# Patient Record
Sex: FEMALE | Race: WHITE | NOT HISPANIC OR LATINO | Employment: OTHER | URBAN - METROPOLITAN AREA
[De-identification: names, ages, dates, MRNs, and addresses within clinical notes are randomized per-mention and may not be internally consistent; named-entity substitution may affect disease eponyms.]

---

## 2017-02-28 ENCOUNTER — OFFICE VISIT (OUTPATIENT)
Dept: ENDOCRINOLOGY | Age: 72
End: 2017-02-28

## 2017-02-28 VITALS
WEIGHT: 185.6 LBS | BODY MASS INDEX: 31.69 KG/M2 | HEIGHT: 64 IN | OXYGEN SATURATION: 90 % | DIASTOLIC BLOOD PRESSURE: 78 MMHG | HEART RATE: 66 BPM | SYSTOLIC BLOOD PRESSURE: 140 MMHG

## 2017-02-28 DIAGNOSIS — E55.9 VITAMIN D DEFICIENCY: ICD-10-CM

## 2017-02-28 DIAGNOSIS — M85.80 OSTEOPENIA: ICD-10-CM

## 2017-02-28 DIAGNOSIS — E55.9 VITAMIN D DEFICIENCY: Primary | ICD-10-CM

## 2017-02-28 DIAGNOSIS — E78.5 HYPERLIPIDEMIA, UNSPECIFIED HYPERLIPIDEMIA TYPE: ICD-10-CM

## 2017-02-28 DIAGNOSIS — R73.01 IMPAIRED FASTING GLUCOSE: ICD-10-CM

## 2017-02-28 DIAGNOSIS — E89.0 POSTSURGICAL HYPOTHYROIDISM: Primary | ICD-10-CM

## 2017-02-28 PROCEDURE — 99214 OFFICE O/P EST MOD 30 MIN: CPT | Performed by: INTERNAL MEDICINE

## 2017-02-28 RX ORDER — ATORVASTATIN CALCIUM 10 MG/1
TABLET, FILM COATED ORAL DAILY
COMMUNITY
Start: 2015-02-05 | End: 2017-02-28

## 2017-02-28 RX ORDER — ASCORBIC ACID 250 MG
240 TABLET,CHEWABLE ORAL DAILY
COMMUNITY

## 2017-02-28 RX ORDER — PRAVASTATIN SODIUM 10 MG
10 TABLET ORAL EVERY EVENING
Qty: 30 TABLET | Refills: 11 | Status: SHIPPED | OUTPATIENT
Start: 2017-02-28 | End: 2017-02-28 | Stop reason: SDUPTHER

## 2017-02-28 RX ORDER — OMEPRAZOLE 20 MG/1
20 CAPSULE, DELAYED RELEASE ORAL
COMMUNITY
Start: 2016-04-09 | End: 2017-02-28

## 2017-02-28 RX ORDER — NICOTINE 14MG/24HR
1 PATCH, TRANSDERMAL 24 HOURS TRANSDERMAL DAILY
COMMUNITY

## 2017-02-28 RX ORDER — PRAVASTATIN SODIUM 10 MG
10 TABLET ORAL EVERY EVENING
Qty: 90 TABLET | Refills: 2 | Status: SHIPPED | OUTPATIENT
Start: 2017-02-28 | End: 2018-02-28

## 2017-02-28 RX ORDER — LEVOTHYROXINE SODIUM 0.1 MG/1
100 TABLET ORAL DAILY
Qty: 90 TABLET | Refills: 2 | Status: SHIPPED | OUTPATIENT
Start: 2017-02-28 | End: 2017-11-15 | Stop reason: SDUPTHER

## 2017-02-28 NOTE — PROGRESS NOTES
"Subjective   Darby Carey is a 71 y.o. female.     HPI Comments: F/u for hypothyroidism     Hypothyroidism   Numbness: tingling in the right leg        Patient is 71 year-old female came in for followup. She has hypothyroidism after she had a thyroidectomy. She has been on Synthroid 100 mcg once a day. She has gained 4 lbs since 5/16. She denies any heat or cold intolerance. She denies any bowel changes.     She has a history of osteopenia and follows with her gynecologist Danielle Chawla. She had a normal mammogram last 6/15. She had bone density in 2016 and there is no change. She is taking  chewable calcium BID but does not know how much Vit D it has.     She has has impaired fasting glucose. Hemoglobin A1c done in February 2015 was 5.4%. She does not exercise regularly. Her brother had diabetes mellitus. Last meal last night.       She has hyperlipidemia.  She did well with pravastatin 10 mg once a day and Co Q 10 area but developed achiness when she went up to 20 mg per day.  She went off Lipitor 10 mg because of muscle weakness and pain.    The following portions of the patient's history were reviewed and updated as appropriate: allergies, current medications, past family history, past medical history, past social history, past surgical history and problem list.    Review of Systems   Constitutional: Negative.    HENT: Negative.    Eyes: Negative.    Respiratory: Negative.    Cardiovascular: Negative.    Gastrointestinal: Negative.    Endocrine: Negative.    Genitourinary: Positive for urgency.   Musculoskeletal: Negative.    Allergic/Immunologic: Negative.    Neurological: Numbness: tingling in the right leg     Hematological: Negative.    Psychiatric/Behavioral: Negative.        Objective      Vitals:    02/28/17 1138   BP: 140/78   BP Location: Right arm   Patient Position: Sitting   Cuff Size: Large Adult   Pulse: 66   SpO2: 90%   Weight: 185 lb 9.6 oz (84.2 kg)   Height: 63.5\" (161.3 cm)     Physical " Exam   Constitutional: She is oriented to person, place, and time. She appears well-developed and well-nourished. No distress.   HENT:   Head: Normocephalic.   Nose: Nose normal.   Mouth/Throat: No oropharyngeal exudate.   Eyes: Conjunctivae and EOM are normal. Right eye exhibits no discharge. Left eye exhibits no discharge. No scleral icterus.   Neck: Neck supple. No JVD present. No tracheal deviation present. No thyromegaly present.   Cardiovascular: Normal rate, regular rhythm, normal heart sounds and intact distal pulses.  Exam reveals no gallop and no friction rub.    No murmur heard.  Pulmonary/Chest: Effort normal and breath sounds normal. No respiratory distress. She has no wheezes. She has no rales. She exhibits no tenderness.   Abdominal: Soft. Bowel sounds are normal. She exhibits no distension and no mass. There is no tenderness.   Musculoskeletal: Normal range of motion. She exhibits no edema, tenderness or deformity.   Lymphadenopathy:     She has no cervical adenopathy.   Neurological: She is alert and oriented to person, place, and time. She displays normal reflexes. Coordination normal.   Skin: Skin is warm and dry. No erythema. No pallor.   Psychiatric: She has a normal mood and affect. Her behavior is normal.     Results Encounter on 07/17/2016   Component Date Value Ref Range Status   • Glucose 08/10/2016 91  65 - 99 mg/dL Final   • BUN 08/10/2016 10  8 - 23 mg/dL Final   • Creatinine 08/10/2016 0.63  0.57 - 1.00 mg/dL Final   • eGFR Non  Am 08/10/2016 94  >60 mL/min/1.73 Final   • eGFR African Am 08/10/2016 114  >60 mL/min/1.73 Final   • BUN/Creatinine Ratio 08/10/2016 15.9  7.0 - 25.0 Final   • Sodium 08/10/2016 145  136 - 145 mmol/L Final   • Potassium 08/10/2016 5.3* 3.5 - 5.2 mmol/L Final   • Chloride 08/10/2016 103  98 - 107 mmol/L Final   • Total CO2 08/10/2016 27.8  22.0 - 29.0 mmol/L Final   • Calcium 08/10/2016 9.5  8.6 - 10.5 mg/dL Final   • Total Protein 08/10/2016 6.9  6.0 -  8.5 g/dL Final   • Albumin 08/10/2016 4.20  3.50 - 5.20 g/dL Final   • Globulin 08/10/2016 2.7  gm/dL Final   • A/G Ratio 08/10/2016 1.6  g/dL Final   • Total Bilirubin 08/10/2016 0.5  0.1 - 1.2 mg/dL Final   • Alkaline Phosphatase 08/10/2016 100  39 - 117 U/L Final   • AST (SGOT) 08/10/2016 18  1 - 32 U/L Final   • ALT (SGPT) 08/10/2016 16  1 - 33 U/L Final   • Total Cholesterol 08/10/2016 194  0 - 200 mg/dL Final   • Triglycerides 08/10/2016 136  0 - 150 mg/dL Final   • HDL Cholesterol 08/10/2016 39* 40 - 60 mg/dL Final   • VLDL Cholesterol 08/10/2016 27.2  5 - 40 mg/dL Final   • LDL Cholesterol  08/10/2016 128* 0 - 100 mg/dL Final   • TSH 08/10/2016 0.969  0.270 - 4.200 mIU/mL Final   • 25 Hydroxy, Vitamin D 08/10/2016 30.2  30.0 - 100.0 ng/mL Final    Comment: Reference Range for Total Vitamin D 25(OH)  Deficiency    <20.0 ng/mL  Insufficiency 21-29 ng/mL  Sufficiency    ng/mL  Toxicity      >100 ng/ml        • Please note 08/10/2016 Comment   Final    Comment: We have received your request for additional testing or test  verification.  You will be notified if we are unable to process  your request.     • Written Authorization 08/10/2016 Comment   Final    Comment: Written Authorization Received.  Authorization received from Written Request 08-  Logged by Chantel Braga       Assessment/Keenan Pastor was seen today for hypothyroidism.    Diagnoses and all orders for this visit:    Postsurgical hypothyroidism  -     Comprehensive Metabolic Panel  -     TSH  -     T4, Free  -     levothyroxine (SYNTHROID) 100 MCG tablet; Take 1 tablet by mouth Daily.    Impaired fasting glucose  -     Hemoglobin A1c    Osteopenia  -     Vitamin D 25 Hydroxy    Vitamin D deficiency  -     Vitamin D 25 Hydroxy    Hyperlipidemia, unspecified hyperlipidemia type  -     Comprehensive Metabolic Panel  -     Lipid Panel  -     Hepatitis C Antibody  -     Discontinue: pravastatin (PRAVACHOL) 10 MG tablet; Take 1 tablet by  mouth Every Evening.  -     pravastatin (PRAVACHOL) 10 MG tablet; Take 1 tablet by mouth Every Evening.      Continue Synthroid 100 µg per day.  Check thyroid function tests.  Check hemoglobin A1c.  Patient has hyperlipidemia.  Her 10 year risk for heart disease and stroke is 8%.  Restart pravastatin 10 mg every evening.  Continue coenzyme Q 10.    Send copy of my note and labs to Dr. Shrestha    RTC 4 mos.

## 2017-03-01 LAB
25(OH)D3+25(OH)D2 SERPL-MCNC: 31.1 NG/ML (ref 30–100)
ALBUMIN SERPL-MCNC: 4.2 G/DL (ref 3.5–5.2)
ALBUMIN/GLOB SERPL: 1.4 G/DL
ALP SERPL-CCNC: 98 U/L (ref 39–117)
ALT SERPL-CCNC: 14 U/L (ref 1–33)
AST SERPL-CCNC: 17 U/L (ref 1–32)
BILIRUB SERPL-MCNC: 0.4 MG/DL (ref 0.1–1.2)
BUN SERPL-MCNC: 9 MG/DL (ref 8–23)
BUN/CREAT SERPL: 13.8 (ref 7–25)
CALCIUM SERPL-MCNC: 9.7 MG/DL (ref 8.6–10.5)
CHLORIDE SERPL-SCNC: 101 MMOL/L (ref 98–107)
CHOLEST SERPL-MCNC: 263 MG/DL (ref 0–200)
CO2 SERPL-SCNC: 29.8 MMOL/L (ref 22–29)
CREAT SERPL-MCNC: 0.65 MG/DL (ref 0.57–1)
GLOBULIN SER CALC-MCNC: 3.1 GM/DL
GLUCOSE SERPL-MCNC: 92 MG/DL (ref 65–99)
HBA1C MFR BLD: 5.3 % (ref 4.8–5.6)
HCV AB S/CO SERPL IA: <0.1 S/CO RATIO (ref 0–0.9)
HDLC SERPL-MCNC: 46 MG/DL (ref 40–60)
LDLC SERPL CALC-MCNC: 188 MG/DL (ref 0–100)
POTASSIUM SERPL-SCNC: 4.6 MMOL/L (ref 3.5–5.2)
PROT SERPL-MCNC: 7.3 G/DL (ref 6–8.5)
SODIUM SERPL-SCNC: 142 MMOL/L (ref 136–145)
T4 FREE SERPL-MCNC: 1.82 NG/DL (ref 0.93–1.7)
TRIGL SERPL-MCNC: 143 MG/DL (ref 0–150)
TSH SERPL DL<=0.005 MIU/L-ACNC: 1.27 MIU/ML (ref 0.27–4.2)
VLDLC SERPL CALC-MCNC: 28.6 MG/DL (ref 5–40)

## 2017-07-25 ENCOUNTER — OFFICE VISIT (OUTPATIENT)
Dept: ENDOCRINOLOGY | Age: 72
End: 2017-07-25

## 2017-07-25 VITALS
HEART RATE: 67 BPM | SYSTOLIC BLOOD PRESSURE: 132 MMHG | WEIGHT: 180.8 LBS | BODY MASS INDEX: 30.87 KG/M2 | OXYGEN SATURATION: 95 % | HEIGHT: 64 IN | DIASTOLIC BLOOD PRESSURE: 70 MMHG

## 2017-07-25 DIAGNOSIS — E55.9 VITAMIN D DEFICIENCY: ICD-10-CM

## 2017-07-25 DIAGNOSIS — M85.80 OSTEOPENIA: ICD-10-CM

## 2017-07-25 DIAGNOSIS — E89.0 POSTSURGICAL HYPOTHYROIDISM: Primary | ICD-10-CM

## 2017-07-25 DIAGNOSIS — R73.01 IMPAIRED FASTING GLUCOSE: ICD-10-CM

## 2017-07-25 DIAGNOSIS — E78.5 HYPERLIPIDEMIA, UNSPECIFIED HYPERLIPIDEMIA TYPE: ICD-10-CM

## 2017-07-25 PROCEDURE — 99214 OFFICE O/P EST MOD 30 MIN: CPT | Performed by: INTERNAL MEDICINE

## 2017-07-25 RX ORDER — CALCIUM CARBONATE 200(500)MG
1 TABLET,CHEWABLE ORAL
COMMUNITY
End: 2017-07-25

## 2017-07-25 RX ORDER — MELATONIN 10 MG
1 TABLET, SUBLINGUAL SUBLINGUAL
COMMUNITY
End: 2017-07-25 | Stop reason: ALTCHOICE

## 2017-07-25 NOTE — PROGRESS NOTES
Subjective   Darby Carey is a 71 y.o. female.     HPI Comments: F/u for hypothyroidism, impaired fasting glucose, hyperlipidemia     Hypothyroidism   Associated symptoms include joint swelling.   Hyperlipidemia   Exacerbating diseases include hypothyroidism.      Patient is 71 year-old female came in for followup. She has hypothyroidism after she had a thyroidectomy. She has been on Synthroid 100 mcg once a day. She has lost 5 lbs since 2/17. She denies any heat or cold intolerance. She denies any bowel changes.      She has a history of osteopenia and follows with her gynecologist Danielle Chawla. She had a normal mammogram last 6/15. She had bone density in 2016 and there is no change. She is taking  chewable calcium BID and Vit D      She has has impaired fasting glucose. Hemoglobin A1c done in February 2017 was 5.3%. She does not exercise regularly. Her brother had diabetes mellitus. Last meal last night.        She has hyperlipidemia.  She is on pravastatin 10 mg once a day and Co Q 10. She went off Lipitor 10 mg because of muscle weakness and pain.  She complains of bilateral knee stiffness without swelling or redness.    The following portions of the patient's history were reviewed and updated as appropriate: allergies, current medications, past family history, past medical history, past social history, past surgical history and problem list.    Review of Systems   Constitutional: Negative.    HENT: Negative.    Eyes: Negative.    Respiratory: Negative.    Cardiovascular: Negative.    Gastrointestinal: Negative.    Endocrine: Negative.    Genitourinary: Negative.    Musculoskeletal: Positive for joint swelling.   Skin: Negative.    Allergic/Immunologic: Negative.    Neurological: Negative.    Hematological: Negative.    Psychiatric/Behavioral: Positive for sleep disturbance.       Objective   Physical Exam   Constitutional: She is oriented to person, place, and time. She appears well-developed and  well-nourished. No distress.   HENT:   Head: Normocephalic.   Nose: Nose normal.   Mouth/Throat: No oropharyngeal exudate.   Eyes: Conjunctivae and EOM are normal. Right eye exhibits no discharge. Left eye exhibits no discharge. No scleral icterus.   Neck: Neck supple. No JVD present. No tracheal deviation present. No thyromegaly present.   Cardiovascular: Normal rate, regular rhythm, normal heart sounds and intact distal pulses.  Exam reveals no friction rub.    No murmur heard.  Pulmonary/Chest: Effort normal and breath sounds normal. No respiratory distress. She has no wheezes. She has no rales.   Abdominal: Soft. Bowel sounds are normal. She exhibits no distension and no mass. There is no tenderness.   Musculoskeletal: Normal range of motion. She exhibits no edema, tenderness or deformity.   Lymphadenopathy:     She has no cervical adenopathy.   Neurological: She is alert and oriented to person, place, and time. She has normal reflexes. She displays normal reflexes. No cranial nerve deficit. Coordination normal.   Skin: Skin is warm and dry. No rash noted. No erythema. No pallor.   Psychiatric: She has a normal mood and affect. Her behavior is normal.     Orders Only on 02/28/2017   Component Date Value Ref Range Status   • 25 Hydroxy, Vitamin D 02/28/2017 31.1  30.0 - 100.0 ng/mL Final    Comment: Vitamin D deficiency has been defined by the [place] [employer] and an Endocrine Society practice guideline as a  level of serum 25-OH vitamin D less than 20 ng/mL (1,2).  The Endocrine Society went on to further define vitamin D  insufficiency as a level between 21 and 29 ng/mL (2).  1. IOM ([place] [employer]). 2010. Dietary reference     intakes for calcium and D. [place]: The     National Academies Press.  2. Ratna MF, [place] NC, Alhaji TONY, et al.     Evaluation, treatment, and prevention of vitamin D     deficiency: an Endocrine Society clinical practice     guideline. JCEM. 2011 Jul;  96(8):6891-30.       Assessment/Plan   Darby was seen today for hypothyroidism and hyperlipidemia.    Diagnoses and all orders for this visit:    Postsurgical hypothyroidism  -     TSH  -     T4, Free    Osteopenia    Impaired fasting glucose  -     Comprehensive Metabolic Panel    Vitamin D deficiency    Hyperlipidemia, unspecified hyperlipidemia type  -     Comprehensive Metabolic Panel  -     Lipid Panel  -     CK  -     Aldolase      Continue Synthroid 100 µg per day.  Check thyroid function tests.  Continue calcium and vitamin D supplements.  Follow-up with gynecologist Dr. Chawla.  Continue low-fat diet.  Hold pravastatin for 2 weeks and see if knee joint pain improves.  Patient advised to follow-up with Dr. King with regards to the right ankle pain.  She may need ankle bracing to prevent knee strain.  Take Tylenol as needed for pain.    Send copy of my notes and labs to Dr. Shrestha    RTC 4 mos.

## 2017-07-26 LAB
ALBUMIN SERPL-MCNC: 4.3 G/DL (ref 3.5–5.2)
ALBUMIN/GLOB SERPL: 1.5 G/DL
ALDOLASE SERPL-CCNC: 2.1 U/L (ref 3.3–10.3)
ALP SERPL-CCNC: 88 U/L (ref 39–117)
ALT SERPL-CCNC: 16 U/L (ref 1–33)
AST SERPL-CCNC: 18 U/L (ref 1–32)
BILIRUB SERPL-MCNC: 0.7 MG/DL (ref 0.1–1.2)
BUN SERPL-MCNC: 11 MG/DL (ref 8–23)
BUN/CREAT SERPL: 15.3 (ref 7–25)
CALCIUM SERPL-MCNC: 10.1 MG/DL (ref 8.6–10.5)
CHLORIDE SERPL-SCNC: 102 MMOL/L (ref 98–107)
CHOLEST SERPL-MCNC: 226 MG/DL (ref 0–200)
CK SERPL-CCNC: 52 U/L (ref 20–180)
CO2 SERPL-SCNC: 25.7 MMOL/L (ref 22–29)
CREAT SERPL-MCNC: 0.72 MG/DL (ref 0.57–1)
GLOBULIN SER CALC-MCNC: 2.8 GM/DL
GLUCOSE SERPL-MCNC: 95 MG/DL (ref 65–99)
HDLC SERPL-MCNC: 44 MG/DL (ref 40–60)
LDLC SERPL CALC-MCNC: 161 MG/DL (ref 0–100)
POTASSIUM SERPL-SCNC: 4.8 MMOL/L (ref 3.5–5.2)
PROT SERPL-MCNC: 7.1 G/DL (ref 6–8.5)
SODIUM SERPL-SCNC: 141 MMOL/L (ref 136–145)
T4 FREE SERPL-MCNC: 1.79 NG/DL (ref 0.93–1.7)
TRIGL SERPL-MCNC: 103 MG/DL (ref 0–150)
TSH SERPL DL<=0.005 MIU/L-ACNC: 2.54 MIU/ML (ref 0.27–4.2)
VLDLC SERPL CALC-MCNC: 20.6 MG/DL (ref 5–40)

## 2017-08-30 ENCOUNTER — APPOINTMENT (OUTPATIENT)
Dept: WOMENS IMAGING | Facility: HOSPITAL | Age: 72
End: 2017-08-30

## 2017-08-30 PROCEDURE — G0202 SCR MAMMO BI INCL CAD: HCPCS | Performed by: RADIOLOGY

## 2017-08-30 PROCEDURE — 77063 BREAST TOMOSYNTHESIS BI: CPT | Performed by: RADIOLOGY

## 2017-10-15 ENCOUNTER — HOSPITAL ENCOUNTER (EMERGENCY)
Facility: HOSPITAL | Age: 72
Discharge: HOME | End: 2017-10-15
Attending: EMERGENCY MEDICINE | Admitting: EMERGENCY MEDICINE

## 2017-10-15 ENCOUNTER — APPOINTMENT (OUTPATIENT)
Dept: GENERAL RADIOLOGY | Facility: HOSPITAL | Age: 72
End: 2017-10-15

## 2017-10-15 VITALS
HEIGHT: 63 IN | WEIGHT: 185 LBS | RESPIRATION RATE: 16 BRPM | HEART RATE: 65 BPM | BODY MASS INDEX: 32.78 KG/M2 | OXYGEN SATURATION: 97 % | DIASTOLIC BLOOD PRESSURE: 67 MMHG | SYSTOLIC BLOOD PRESSURE: 125 MMHG | TEMPERATURE: 98.3 F

## 2017-10-15 DIAGNOSIS — M54.16 LUMBAR RADICULOPATHY, ACUTE: Primary | ICD-10-CM

## 2017-10-15 PROCEDURE — 25010000002 LORAZEPAM PER 2 MG: Performed by: PHYSICIAN ASSISTANT

## 2017-10-15 PROCEDURE — 72110 X-RAY EXAM L-2 SPINE 4/>VWS: CPT

## 2017-10-15 PROCEDURE — 96375 TX/PRO/DX INJ NEW DRUG ADDON: CPT

## 2017-10-15 PROCEDURE — 96374 THER/PROPH/DIAG INJ IV PUSH: CPT

## 2017-10-15 PROCEDURE — 25010000002 MORPHINE PER 10 MG: Performed by: EMERGENCY MEDICINE

## 2017-10-15 PROCEDURE — 99283 EMERGENCY DEPT VISIT LOW MDM: CPT

## 2017-10-15 RX ORDER — SODIUM CHLORIDE 0.9 % (FLUSH) 0.9 %
10 SYRINGE (ML) INJECTION AS NEEDED
Status: DISCONTINUED | OUTPATIENT
Start: 2017-10-15 | End: 2017-10-15 | Stop reason: HOSPADM

## 2017-10-15 RX ORDER — LORAZEPAM 2 MG/ML
0.5 INJECTION INTRAMUSCULAR ONCE
Status: COMPLETED | OUTPATIENT
Start: 2017-10-15 | End: 2017-10-15

## 2017-10-15 RX ORDER — MORPHINE SULFATE 2 MG/ML
2 INJECTION, SOLUTION INTRAMUSCULAR; INTRAVENOUS ONCE
Status: COMPLETED | OUTPATIENT
Start: 2017-10-15 | End: 2017-10-15

## 2017-10-15 RX ORDER — HYDROCODONE BITARTRATE AND ACETAMINOPHEN 5; 325 MG/1; MG/1
1 TABLET ORAL EVERY 6 HOURS PRN
Qty: 12 TABLET | Refills: 0 | Status: SHIPPED | OUTPATIENT
Start: 2017-10-15 | End: 2017-12-18

## 2017-10-15 RX ORDER — DIAZEPAM 5 MG/ML
5 INJECTION, SOLUTION INTRAMUSCULAR; INTRAVENOUS ONCE
Status: DISCONTINUED | OUTPATIENT
Start: 2017-10-15 | End: 2017-10-15

## 2017-10-15 RX ADMIN — MORPHINE SULFATE 2 MG: 2 INJECTION, SOLUTION INTRAMUSCULAR; INTRAVENOUS at 12:05

## 2017-10-15 RX ADMIN — LORAZEPAM 0.5 MG: 2 INJECTION, SOLUTION INTRAMUSCULAR; INTRAVENOUS at 13:06

## 2017-10-15 NOTE — ED PROVIDER NOTES
The patient presents complaining of pain that radiates from her L buttock down her LLE X 5 days. Denies any recent injuries. Pt reports that Mobic she was given at  is not working for her pain. Pt denies abdominal pain, new weakness, numbness, incontinence.  Denies recent surgical/dental/epidural procedures.    Limited physical exam:  Patient is nontoxic appearing  Lungs/cardiovascular: CTAB  Abdomen: Soft nontender  Back/extremities: Tenderness to SI area/buttock L   No T/L vertebral tenderness. Posterior Tibial pulses intact B. Negative SLR B.  Patellar and achilles reflexes intact 2+ B.  Pt with sensation, strength intact bilat lower ext.     I supervised care provided by the midlevel provider.  We have discussed this patient's history, physical exam, and treatment plan.  I have reviewed the note and personally saw and examined the patient and agree with the plan of care.    Documentation assistance provided by waqaribalin Diaz.  Information recorded by the scribe was done at my direction and has been verified and validated by me.         Pao Wallace  10/15/17 1343       Radha Diaz MD  10/16/17 1682

## 2017-10-15 NOTE — DISCHARGE INSTRUCTIONS
PLEASE READ AND REVIEW ALL DISCHARGE INSTRUCTIONS.     Please follow up with your primary care physician for any further evaluation/treatment and further management of your blood pressure.     Recheck in emergency department for any worsening and/or concerning symptoms.     Take all prescribed medicine as written and continue chronic medication.    DO NOT DRIVE WHILE TAKING PAIN MEDICATION OR MUSCLE RELAXER.    Call neurosurgery (listed in your discharge instructions) for further evaluation of your back pain.

## 2017-10-15 NOTE — ED PROVIDER NOTES
"EMERGENCY DEPARTMENT ENCOUNTER    CHIEF COMPLAINT  Chief Complaint: Leg pain  History given by:Patient  History limited by:None  Room Number: 06/06  PMD: Karri Jolly MD      HPI:  Pt is a 71 y.o. female who presents with radiating L leg pain onset 5 days ago. The patient initially thought the pain was due to a '[place]ad Horse', however the symptoms persisted. She states travels from from her buttock area all down her RLE. She was told by urgent care that she had a \"pinched nerve\" upon examination at . Patient was given a steroid shot and Rx of Mobic.  She denies any aggressive physical activity, heaving lifting, injury, dysuria, fever, and urinary or fecal incontinence.     Duration: 5 days ago  Timing:constant  Location: LLE  Radiation:to her buttock  Quality: pain  Intensity/Severity: moderate  Progression: unchanged  Associated Symptoms: pain shooting down leg  Aggravating Factors: movement, lifting  Alleviating Factors: rest  Previous Episodes: occasional  Treatment before arrival: none    MEDICAL RECORD REVIEW  The patient has no pertinent past medical history. She was seen at  on 10/12/17 by Dr. Ortiz. She was diagnosed with lumbar radiculopathy and given a DepoMedrol shot and Rx of Mobic.     PAST MEDICAL HISTORY  Active Ambulatory Problems     Diagnosis Date Noted   • Postsurgical hypothyroidism 05/05/2016   • Osteopenia 05/05/2016   • Impaired fasting glucose 05/05/2016   • Hyperlipidemia 05/05/2016   • Vitamin D deficiency 08/15/2016     Resolved Ambulatory Problems     Diagnosis Date Noted   • No Resolved Ambulatory Problems     Past Medical History:   Diagnosis Date   • Thyroid nodule        PAST SURGICAL HISTORY  Past Surgical History:   Procedure Laterality Date   • THYROID SURGERY         FAMILY HISTORY  Family History   Problem Relation Age of Onset   • Cancer Mother    • Cancer Father    • Diabetes Brother    • Cancer Brother    • Anemia Paternal Grandmother    • Colon cancer Paternal " Grandmother    • Anemia Paternal Grandfather    • Colon cancer Paternal Grandfather    • Cancer Brother    • Aortic aneurysm Brother    • Liver disease Brother        SOCIAL HISTORY  Social History     Social History   • Marital status:      Spouse name: N/A   • Number of children: N/A   • Years of education: N/A     Occupational History   • Not on file.     Social History Main Topics   • Smoking status: Never Smoker   • Smokeless tobacco: Not on file   • Alcohol use No   • Drug use: Not on file   • Sexual activity: Not on file     Other Topics Concern   • Not on file     Social History Narrative       ALLERGIES  Review of patient's allergies indicates no known allergies.    REVIEW OF SYSTEMS  Review of Systems   Constitutional: Negative for fever.   HENT: Negative for sore throat.    Eyes: Negative.    Respiratory: Negative for cough and shortness of breath.    Cardiovascular: Negative for chest pain.   Gastrointestinal: Negative for abdominal pain, diarrhea and vomiting.   Genitourinary: Negative for dysuria.   Musculoskeletal: Negative for neck pain.        L leg pain   Skin: Negative for rash.   Allergic/Immunologic: Negative.    Neurological: Negative for weakness, numbness and headaches.   Hematological: Negative.    Psychiatric/Behavioral: Negative.    All other systems reviewed and are negative.      PHYSICAL EXAM  ED Triage Vitals   Temp Heart Rate Resp BP SpO2   10/15/17 0949 10/15/17 0949 10/15/17 0949 10/15/17 1039 10/15/17 0949   97.7 °F (36.5 °C) 91 18 176/85 98 %      Temp src Heart Rate Source Patient Position BP Location FiO2 (%)   10/15/17 0949 10/15/17 0949 -- -- --   Tympanic Monitor          Physical Exam   Constitutional: She is oriented to person, place, and time and well-developed, well-nourished, and in no distress. No distress.   HENT:   Head: Normocephalic and atraumatic.   Eyes: EOM are normal. Pupils are equal, round, and reactive to light.   Neck: Normal range of motion. Neck  supple.   Cardiovascular: Normal rate, regular rhythm, normal heart sounds and intact distal pulses.    Pulmonary/Chest: Effort normal and breath sounds normal. No respiratory distress.   Abdominal: Soft. There is no tenderness. There is no rebound and no guarding.   Musculoskeletal: Normal range of motion. She exhibits no edema.   Good flexion and extension of bilateral feet and great toe.No swelling, tenderness, or redness to L posterior calf.   Neurological: She is alert and oriented to person, place, and time. She has normal sensation and normal strength.   Negative L SLR.   Skin: Skin is warm and dry. No rash noted.   Psychiatric: Mood and affect normal.   Nursing note and vitals reviewed.        RADIOLOGY  XR Spine Lumbar 4+ View   Final Result       No acute fracture is identified. Degenerative changes. For further   evaluation, if clinically indicated, MRI could be considered       This report was finalized on 10/15/2017 1:16 PM by Dr. Mil Arreaga MD.              I ordered the above noted radiological studies and reviewed the images on the PACS system.     PROCEDURES      COURSE & MEDICAL DECISION MAKING  Pertinent Imaging studies that were ordered and reviewed are noted above.  Results were reviewed/discussed with the patient and they were also made aware of online assess.  Pt also made aware that some labs, such as cultures, will not be resulted during ER visit and follow up with PMD is necessary.       PROGRESS AND CONSULTS    Progress Notes:    1136  Ordered XR Spine for further evaluation. Ordered Valium for sedation. Ordered Morphine for pain.     1230  Reviewed pt's history and workup with Dr. Diaz.  After a bedside evaluation; Dr Diaz agrees with the plan of care.    1310  The patient's history, physical exam, and image findings were discussed with the physician, who also performed a face to face history and physical exam.  I discussed all results and noted any abnormalities with patient.   "Discussed absoute need to recheck abnormalities with their family physician.  I answered any of the patient's questions.  Discussed plan for discharge, as there is no emergent indication for admission.  Pt is agreeable and understands need for follow up and repeat testing.  Pt is aware that discharge does not mean that nothing is wrong but it indicates no emergency is present and they must continue care with their family physician.  Pt is discharged with instructions to follow up with primary care doctor to have their blood pressure rechecked.       MEDICATIONS GIVEN IN ER  Medications   sodium chloride 0.9 % flush 10 mL (not administered)   morphine injection 2 mg (2 mg Intravenous Given 10/15/17 1205)   LORazepam (ATIVAN) injection 0.5 mg (0.5 mg Intravenous Given 10/15/17 1306)       /85  Pulse 91  Temp 97.7 °F (36.5 °C) (Tympanic)   Resp 18  Ht 63\" (160 cm)  Wt 185 lb (83.9 kg)  SpO2 98%  BMI 32.77 kg/m2      DIAGNOSIS  Final diagnoses:   Lumbar radiculopathy, acute       FOLLOW UP   Karri Jolly MD  [address] [id number] [phone]          Michele Rowe MD  [address] [address]  [phone]            RX     Medication List      New Prescriptions          HYDROcodone-acetaminophen 5-325 MG per tablet   Commonly known as:  NORCO   Take 1 tablet by mouth Every 6 (Six) Hours As Needed for Moderate Pain .         Changed          Co-Enzyme Q10 100 MG capsule   Take 1 tablet by mouth daily.   What changed:  how much to take           Documentation assistance provided by dc Duenas for Yvonne Escobar PA-C.  Information recorded by the dc was done at my direction and has been verified and validated by me.  Electronically signed by Yvonne Escobar on 10/15/2017 at time 2:07 PM         Deneen Duenas  10/15/17 1409       Yvonne Escobar PA-C  10/15/17 1414    "

## 2017-11-15 DIAGNOSIS — E89.0 POSTSURGICAL HYPOTHYROIDISM: ICD-10-CM

## 2017-11-15 RX ORDER — LEVOTHYROXINE SODIUM 0.1 MG/1
100 TABLET ORAL DAILY
Qty: 90 TABLET | Refills: 1 | Status: SHIPPED | OUTPATIENT
Start: 2017-11-15 | End: 2017-12-18 | Stop reason: SDUPTHER

## 2017-12-18 ENCOUNTER — OFFICE VISIT (OUTPATIENT)
Dept: ENDOCRINOLOGY | Age: 72
End: 2017-12-18

## 2017-12-18 VITALS
SYSTOLIC BLOOD PRESSURE: 112 MMHG | OXYGEN SATURATION: 90 % | HEART RATE: 65 BPM | HEIGHT: 63 IN | BODY MASS INDEX: 32 KG/M2 | WEIGHT: 180.6 LBS | DIASTOLIC BLOOD PRESSURE: 68 MMHG

## 2017-12-18 DIAGNOSIS — R73.01 IMPAIRED FASTING GLUCOSE: Primary | ICD-10-CM

## 2017-12-18 DIAGNOSIS — Z23 NEED FOR IMMUNIZATION AGAINST INFLUENZA: ICD-10-CM

## 2017-12-18 DIAGNOSIS — Z23 NEED FOR INFLUENZA VACCINATION: ICD-10-CM

## 2017-12-18 DIAGNOSIS — Z23 NEED FOR IMMUNIZATION AGAINST INFLUENZA: Primary | ICD-10-CM

## 2017-12-18 DIAGNOSIS — M85.80 OSTEOPENIA, UNSPECIFIED LOCATION: ICD-10-CM

## 2017-12-18 DIAGNOSIS — E78.5 HYPERLIPIDEMIA, UNSPECIFIED HYPERLIPIDEMIA TYPE: ICD-10-CM

## 2017-12-18 DIAGNOSIS — E89.0 POSTSURGICAL HYPOTHYROIDISM: ICD-10-CM

## 2017-12-18 DIAGNOSIS — E55.9 VITAMIN D DEFICIENCY: ICD-10-CM

## 2017-12-18 LAB
25(OH)D3+25(OH)D2 SERPL-MCNC: 36.2 NG/ML (ref 30–100)
ALBUMIN SERPL-MCNC: 4.3 G/DL (ref 3.5–5.2)
ALBUMIN/GLOB SERPL: 1.5 G/DL
ALP SERPL-CCNC: 100 U/L (ref 39–117)
ALT SERPL-CCNC: 10 U/L (ref 1–33)
AST SERPL-CCNC: 12 U/L (ref 1–32)
BILIRUB SERPL-MCNC: 0.3 MG/DL (ref 0.1–1.2)
BUN SERPL-MCNC: 14 MG/DL (ref 8–23)
BUN/CREAT SERPL: 20.6 (ref 7–25)
CALCIUM SERPL-MCNC: 9.4 MG/DL (ref 8.6–10.5)
CHLORIDE SERPL-SCNC: 101 MMOL/L (ref 98–107)
CHOLEST SERPL-MCNC: 207 MG/DL (ref 0–200)
CO2 SERPL-SCNC: 31.9 MMOL/L (ref 22–29)
CREAT SERPL-MCNC: 0.68 MG/DL (ref 0.57–1)
GFR SERPLBLD CREATININE-BSD FMLA CKD-EPI: 103 ML/MIN/1.73
GFR SERPLBLD CREATININE-BSD FMLA CKD-EPI: 85 ML/MIN/1.73
GLOBULIN SER CALC-MCNC: 2.9 GM/DL
GLUCOSE SERPL-MCNC: 94 MG/DL (ref 65–99)
HBA1C MFR BLD: 5.4 % (ref 4.8–5.6)
HDLC SERPL-MCNC: 45 MG/DL (ref 40–60)
INTERPRETATION: NORMAL
LDLC SERPL CALC-MCNC: 130 MG/DL (ref 0–100)
POTASSIUM SERPL-SCNC: 5 MMOL/L (ref 3.5–5.2)
PROT SERPL-MCNC: 7.2 G/DL (ref 6–8.5)
SODIUM SERPL-SCNC: 143 MMOL/L (ref 136–145)
T4 FREE SERPL-MCNC: 1.68 NG/DL (ref 0.93–1.7)
TRIGL SERPL-MCNC: 158 MG/DL (ref 0–150)
TSH SERPL DL<=0.005 MIU/L-ACNC: 3.2 MIU/ML (ref 0.27–4.2)
VLDLC SERPL CALC-MCNC: 31.6 MG/DL (ref 5–40)

## 2017-12-18 PROCEDURE — 90471 IMMUNIZATION ADMIN: CPT | Performed by: INTERNAL MEDICINE

## 2017-12-18 PROCEDURE — 99214 OFFICE O/P EST MOD 30 MIN: CPT | Performed by: INTERNAL MEDICINE

## 2017-12-18 PROCEDURE — 90662 IIV NO PRSV INCREASED AG IM: CPT | Performed by: INTERNAL MEDICINE

## 2017-12-18 RX ORDER — METOPROLOL SUCCINATE 25 MG/1
25 TABLET, EXTENDED RELEASE ORAL DAILY
COMMUNITY
Start: 2017-11-16 | End: 2018-12-14

## 2017-12-18 RX ORDER — OXYCODONE HYDROCHLORIDE AND ACETAMINOPHEN 5; 325 MG/1; MG/1
TABLET ORAL
COMMUNITY
Start: 2017-10-20 | End: 2017-12-18

## 2017-12-18 RX ORDER — MELOXICAM 15 MG/1
TABLET ORAL
COMMUNITY
Start: 2017-10-12 | End: 2017-12-18

## 2017-12-18 RX ORDER — CYCLOBENZAPRINE HCL 5 MG
5 TABLET ORAL NIGHTLY
COMMUNITY
Start: 2017-12-02 | End: 2018-12-14

## 2017-12-18 RX ORDER — LEVOTHYROXINE SODIUM 0.1 MG/1
100 TABLET ORAL DAILY
Qty: 90 TABLET | Refills: 2 | Status: SHIPPED | OUTPATIENT
Start: 2017-12-18 | End: 2018-06-27 | Stop reason: SDUPTHER

## 2017-12-18 NOTE — PROGRESS NOTES
Subjective   Darby Carey is a 72 y.o. female.     HPI Comments: F/u for impaired fasting glucose tolerance, hypothyroidism, hyperlipidemia / flu vaccine given today     Hypothyroidism     Hyperlipidemia   Exacerbating diseases include hypothyroidism.    Patient is 71 year-old female came in for followup. She has hypothyroidism after she had a thyroidectomy. She has been on Synthroid 100 mcg once a day and her co-pay is higher next year.   She has lost 5 lbs since 2/17. She denies any heat or cold intolerance. She denies any bowel changes.      She has a history of osteopenia and follows with her gynecologist Danielle Chawla. She had a normal mammogram last 7/17. She had bone density in 2016 and there is no change. She is taking  chewable calcium BID and Vit D.      She has has impaired fasting glucose. Hemoglobin A1c done in February 2017 was 5.3%. She does not exercise regularly. Her brother had diabetes mellitus. Last meal last night.        She has hyperlipidemia.  She is on pravastatin 10 mg once a day and Co Q 10.  She misses taking pravastatin 2 times a week.  She went off Lipitor 10 mg because of muscle weakness and pain.      The following portions of the patient's history were reviewed and updated as appropriate: allergies, current medications, past family history, past medical history, past social history, past surgical history and problem list.    Review of Systems   Constitutional: Negative.    HENT: Negative.    Eyes: Negative.    Respiratory: Negative.    Cardiovascular: Negative.    Gastrointestinal: Negative.    Endocrine: Negative.    Genitourinary: Negative.    Musculoskeletal: Negative.    Skin: Negative.    Allergic/Immunologic: Negative.    Neurological: Negative.    Hematological: Negative.    Psychiatric/Behavioral: Negative.        Objective      Vitals:    12/18/17 1002   BP: 112/68   BP Location: Right arm   Patient Position: Sitting   Cuff Size: Large Adult   Pulse: 65   SpO2: 90%  "  Weight: 81.9 kg (180 lb 9.6 oz)   Height: 160 cm (62.99\")     Physical Exam   Constitutional: She is oriented to person, place, and time. She appears well-developed and well-nourished. No distress.   HENT:   Head: Normocephalic.   Nose: Nose normal.   Mouth/Throat: No oropharyngeal exudate.   Eyes: Conjunctivae and EOM are normal. Right eye exhibits no discharge. Left eye exhibits no discharge. No scleral icterus.   Neck: Neck supple. No JVD present. No tracheal deviation present. No thyromegaly present.   Cardiovascular: Normal rate, regular rhythm, normal heart sounds and intact distal pulses.  Exam reveals no gallop and no friction rub.    No murmur heard.  Pulmonary/Chest: Effort normal and breath sounds normal. No respiratory distress. She has no wheezes. She has no rales. She exhibits no tenderness.   Abdominal: Soft. Bowel sounds are normal. She exhibits no distension and no mass. There is no tenderness.   Musculoskeletal: Normal range of motion. She exhibits no edema, tenderness or deformity.   Lymphadenopathy:     She has no cervical adenopathy.   Neurological: She is alert and oriented to person, place, and time. She has normal reflexes. She displays normal reflexes. Coordination normal.   Skin: Skin is warm and dry. No rash noted. No erythema. No pallor.   Psychiatric: She has a normal mood and affect. Her behavior is normal.     Office Visit on 07/25/2017   Component Date Value Ref Range Status   • Glucose 07/25/2017 95  65 - 99 mg/dL Final   • BUN 07/25/2017 11  8 - 23 mg/dL Final   • Creatinine 07/25/2017 0.72  0.57 - 1.00 mg/dL Final   • eGFR Non African Am 07/25/2017 80  >60 mL/min/1.73 Final   • eGFR African Am 07/25/2017 97  >60 mL/min/1.73 Final   • BUN/Creatinine Ratio 07/25/2017 15.3  7.0 - 25.0 Final   • Sodium 07/25/2017 141  136 - 145 mmol/L Final   • Potassium 07/25/2017 4.8  3.5 - 5.2 mmol/L Final   • Chloride 07/25/2017 102  98 - 107 mmol/L Final   • Total CO2 07/25/2017 25.7  22.0 - " 29.0 mmol/L Final   • Calcium 07/25/2017 10.1  8.6 - 10.5 mg/dL Final   • Total Protein 07/25/2017 7.1  6.0 - 8.5 g/dL Final   • Albumin 07/25/2017 4.30  3.50 - 5.20 g/dL Final   • Globulin 07/25/2017 2.8  gm/dL Final   • A/G Ratio 07/25/2017 1.5  g/dL Final   • Total Bilirubin 07/25/2017 0.7  0.1 - 1.2 mg/dL Final   • Alkaline Phosphatase 07/25/2017 88  39 - 117 U/L Final   • AST (SGOT) 07/25/2017 18  1 - 32 U/L Final   • ALT (SGPT) 07/25/2017 16  1 - 33 U/L Final   • TSH 07/25/2017 2.540  0.270 - 4.200 mIU/mL Final   • Free T4 07/25/2017 1.79* 0.93 - 1.70 ng/dL Final   • Total Cholesterol 07/25/2017 226* 0 - 200 mg/dL Final   • Triglycerides 07/25/2017 103  0 - 150 mg/dL Final   • HDL Cholesterol 07/25/2017 44  40 - 60 mg/dL Final   • VLDL Cholesterol 07/25/2017 20.6  5 - 40 mg/dL Final   • LDL Cholesterol  07/25/2017 161* 0 - 100 mg/dL Final   • Creatine Kinase 07/25/2017 52  20 - 180 U/L Final   • Aldolase 07/25/2017 2.1* 3.3 - 10.3 U/L Final     Assessment/Plan   Darby was seen today for hypothyroidism and hyperlipidemia.    Diagnoses and all orders for this visit:    Impaired fasting glucose  -     Comprehensive Metabolic Panel  -     Hemoglobin A1c    Postsurgical hypothyroidism  -     levothyroxine (SYNTHROID) 100 MCG tablet; Take 1 tablet by mouth Daily.  -     TSH  -     T4, Free    Vitamin D deficiency  -     levothyroxine (SYNTHROID) 100 MCG tablet; Take 1 tablet by mouth Daily.  -     Vitamin D 25 Hydroxy    Hyperlipidemia, unspecified hyperlipidemia type  -     levothyroxine (SYNTHROID) 100 MCG tablet; Take 1 tablet by mouth Daily.  -     Lipid Panel  -     TSH  -     T4, Free    Osteopenia, unspecified location  -     levothyroxine (SYNTHROID) 100 MCG tablet; Take 1 tablet by mouth Daily.  -     Vitamin D 25 Hydroxy      Switch to generic levothyroxine 100 µg per day.  Recheck thyroid function tests 2 months after the switch.  Request given to patient.  Check hemoglobin A1c.  Continue chewable calcium  twice a day and vitamin D supplements.  Continue pravastatin 10 mg every evening.    Send copy of my notes and labs to Dr. Shrestha and Dr. Danielle Chawla    RTC 4 mos.

## 2017-12-19 DIAGNOSIS — E89.0 POSTSURGICAL HYPOTHYROIDISM: Primary | ICD-10-CM

## 2018-02-19 ENCOUNTER — RESULTS ENCOUNTER (OUTPATIENT)
Dept: ENDOCRINOLOGY | Age: 73
End: 2018-02-19

## 2018-02-19 DIAGNOSIS — E89.0 POSTSURGICAL HYPOTHYROIDISM: ICD-10-CM

## 2018-06-27 ENCOUNTER — TELEPHONE (OUTPATIENT)
Dept: ENDOCRINOLOGY | Age: 73
End: 2018-06-27

## 2018-06-27 DIAGNOSIS — E89.0 POSTSURGICAL HYPOTHYROIDISM: ICD-10-CM

## 2018-06-27 DIAGNOSIS — E78.5 HYPERLIPIDEMIA, UNSPECIFIED HYPERLIPIDEMIA TYPE: ICD-10-CM

## 2018-06-27 DIAGNOSIS — E55.9 VITAMIN D DEFICIENCY: ICD-10-CM

## 2018-06-27 DIAGNOSIS — M85.80 OSTEOPENIA, UNSPECIFIED LOCATION: ICD-10-CM

## 2018-06-27 RX ORDER — LEVOTHYROXINE SODIUM 0.1 MG/1
100 TABLET ORAL DAILY
Qty: 90 TABLET | Refills: 0 | Status: SHIPPED | OUTPATIENT
Start: 2018-06-27 | End: 2018-06-29 | Stop reason: SDUPTHER

## 2018-06-27 RX ORDER — LEVOTHYROXINE SODIUM 100 MCG
100 TABLET ORAL EVERY MORNING
Qty: 90 TABLET | Refills: 0 | Status: SHIPPED | OUTPATIENT
Start: 2018-06-27 | End: 2018-06-29 | Stop reason: SDUPTHER

## 2018-06-27 NOTE — TELEPHONE ENCOUNTER
----- Message from Rimma Reed sent at 6/27/2018 10:30 AM EDT -----  Contact: PATIENT   PATIENT   REQUEST TO REFILL ISSUES WITH MEDICATION:    MEDICATION:  SYNTHROID  100 MCG tablet      MESSAGE:  PATIENT HAS CALLED IN REGARDS TO GETTING THE ABOVE MEDICATION REFILLED. PLEASE SENT IN THE BRAND NAME ONLY.  PATIENT CAN GET A 90 DAY SUPPLY FOR 10$. PLEASE SENT THE MEDICATION TO THE FOLLOWING PHARMACY  -[facility]     ANY QUESTION , PLEASE CALL THE PATIENT DIRECTLY.    PHONE NUMBER: [phone]

## 2018-06-29 ENCOUNTER — TELEPHONE (OUTPATIENT)
Dept: ENDOCRINOLOGY | Age: 73
End: 2018-06-29

## 2018-06-29 DIAGNOSIS — M85.80 OSTEOPENIA, UNSPECIFIED LOCATION: ICD-10-CM

## 2018-06-29 DIAGNOSIS — E78.5 HYPERLIPIDEMIA, UNSPECIFIED HYPERLIPIDEMIA TYPE: ICD-10-CM

## 2018-06-29 DIAGNOSIS — E55.9 VITAMIN D DEFICIENCY: ICD-10-CM

## 2018-06-29 DIAGNOSIS — E89.0 POSTSURGICAL HYPOTHYROIDISM: ICD-10-CM

## 2018-06-29 RX ORDER — LEVOTHYROXINE SODIUM 0.1 MG/1
100 TABLET ORAL DAILY
Qty: 90 TABLET | Refills: 1 | Status: SHIPPED | OUTPATIENT
Start: 2018-06-29 | End: 2018-11-28 | Stop reason: SDUPTHER

## 2018-06-29 NOTE — TELEPHONE ENCOUNTER
----- Message from Keith Lerner sent at 6/29/2018  1:25 PM EDT -----  Contact: PATIENT   PATIENT STATED THAT THE WRONG CODE WAS SUBMITTED ON THE FOLLOWING, SYNTHROID 100 MCG tablet    PLEASE RESEND WITH CODE DAW5 TO     [facility] - [place], [place] - [address] AT [facility] - [phone]  - [phone] (Phone)  [phone] (Fax)    PATIENT IS RUNNING LOW ON MEDICATION,

## 2018-09-05 ENCOUNTER — APPOINTMENT (OUTPATIENT)
Dept: WOMENS IMAGING | Facility: HOSPITAL | Age: 73
End: 2018-09-05

## 2018-09-05 PROCEDURE — 77063 BREAST TOMOSYNTHESIS BI: CPT | Performed by: RADIOLOGY

## 2018-09-05 PROCEDURE — 77067 SCR MAMMO BI INCL CAD: CPT | Performed by: RADIOLOGY

## 2018-11-28 DIAGNOSIS — E55.9 VITAMIN D DEFICIENCY: ICD-10-CM

## 2018-11-28 DIAGNOSIS — E89.0 POSTSURGICAL HYPOTHYROIDISM: ICD-10-CM

## 2018-11-28 DIAGNOSIS — E78.5 HYPERLIPIDEMIA, UNSPECIFIED HYPERLIPIDEMIA TYPE: ICD-10-CM

## 2018-11-28 DIAGNOSIS — M85.80 OSTEOPENIA, UNSPECIFIED LOCATION: ICD-10-CM

## 2018-11-28 RX ORDER — LEVOTHYROXINE SODIUM 100 MCG
TABLET ORAL
Qty: 90 TABLET | Refills: 0 | Status: SHIPPED | OUTPATIENT
Start: 2018-11-28 | End: 2018-12-14 | Stop reason: SDUPTHER

## 2018-12-14 ENCOUNTER — OFFICE VISIT (OUTPATIENT)
Dept: ENDOCRINOLOGY | Age: 73
End: 2018-12-14

## 2018-12-14 VITALS
OXYGEN SATURATION: 96 % | SYSTOLIC BLOOD PRESSURE: 124 MMHG | BODY MASS INDEX: 32.96 KG/M2 | HEART RATE: 69 BPM | DIASTOLIC BLOOD PRESSURE: 60 MMHG | HEIGHT: 63 IN | WEIGHT: 186 LBS

## 2018-12-14 DIAGNOSIS — E55.9 VITAMIN D DEFICIENCY: ICD-10-CM

## 2018-12-14 DIAGNOSIS — R73.01 IMPAIRED FASTING GLUCOSE: Primary | ICD-10-CM

## 2018-12-14 DIAGNOSIS — E89.0 POSTSURGICAL HYPOTHYROIDISM: ICD-10-CM

## 2018-12-14 DIAGNOSIS — M85.80 OSTEOPENIA, UNSPECIFIED LOCATION: ICD-10-CM

## 2018-12-14 DIAGNOSIS — E78.5 HYPERLIPIDEMIA, UNSPECIFIED HYPERLIPIDEMIA TYPE: ICD-10-CM

## 2018-12-14 LAB
25(OH)D3+25(OH)D2 SERPL-MCNC: 32.6 NG/ML (ref 30–100)
ALBUMIN SERPL-MCNC: 4.1 G/DL (ref 3.5–5.2)
ALBUMIN/GLOB SERPL: 1.5 G/DL
ALP SERPL-CCNC: 86 U/L (ref 39–117)
ALT SERPL-CCNC: 18 U/L (ref 1–33)
AST SERPL-CCNC: 18 U/L (ref 1–32)
BILIRUB SERPL-MCNC: 0.5 MG/DL (ref 0.1–1.2)
BUN SERPL-MCNC: 12 MG/DL (ref 8–23)
BUN/CREAT SERPL: 17.4 (ref 7–25)
CALCIUM SERPL-MCNC: 9.4 MG/DL (ref 8.6–10.5)
CHLORIDE SERPL-SCNC: 104 MMOL/L (ref 98–107)
CHOLEST SERPL-MCNC: 250 MG/DL (ref 0–200)
CO2 SERPL-SCNC: 27.9 MMOL/L (ref 22–29)
CREAT SERPL-MCNC: 0.69 MG/DL (ref 0.57–1)
GLOBULIN SER CALC-MCNC: 2.8 GM/DL
GLUCOSE SERPL-MCNC: 95 MG/DL (ref 65–99)
HBA1C MFR BLD: 5.04 % (ref 4.8–5.6)
HDLC SERPL-MCNC: 42 MG/DL (ref 40–60)
INTERPRETATION: NORMAL
LDLC SERPL CALC-MCNC: 186 MG/DL (ref 0–100)
Lab: NORMAL
POTASSIUM SERPL-SCNC: 4.9 MMOL/L (ref 3.5–5.2)
PROT SERPL-MCNC: 6.9 G/DL (ref 6–8.5)
SODIUM SERPL-SCNC: 144 MMOL/L (ref 136–145)
T4 FREE SERPL-MCNC: 1.73 NG/DL (ref 0.93–1.7)
TRIGL SERPL-MCNC: 112 MG/DL (ref 0–150)
TSH SERPL DL<=0.005 MIU/L-ACNC: 2.49 MIU/ML (ref 0.27–4.2)
VLDLC SERPL CALC-MCNC: 22.4 MG/DL (ref 5–40)

## 2018-12-14 PROCEDURE — 90674 CCIIV4 VAC NO PRSV 0.5 ML IM: CPT | Performed by: INTERNAL MEDICINE

## 2018-12-14 PROCEDURE — G0008 ADMIN INFLUENZA VIRUS VAC: HCPCS | Performed by: INTERNAL MEDICINE

## 2018-12-14 PROCEDURE — 99214 OFFICE O/P EST MOD 30 MIN: CPT | Performed by: INTERNAL MEDICINE

## 2018-12-14 RX ORDER — LEVOTHYROXINE SODIUM 0.1 MG/1
100 TABLET ORAL DAILY
Qty: 90 TABLET | Refills: 1 | Status: SHIPPED | OUTPATIENT
Start: 2018-12-14 | End: 2019-06-28 | Stop reason: SDUPTHER

## 2018-12-14 NOTE — PROGRESS NOTES
Subjective   Darby Carey is a 72 y.o. female.     F/u for impaired glucose tolerance, hypothyroidism, hyperlipidemia / flu vaccine given today         Hypothyroidism   Associated symptoms include numbness (legs ).   Hyperlipidemia   Exacerbating diseases include hypothyroidism.      Patient is 72 year-old female came in for followup. She has hypothyroidism after she had a thyroidectomy. She has been on Synthroid 100 mcg once a day.  She has gained 5 lbs since 12/17. She denies any heat or cold intolerance. She denies any bowel changes.      She has a history of osteopenia and follows with her gynecologist Danielle Chawla. She had a normal mammogram last 7/17. She had bone density in 2018 and there is no change. She is taking  chewable calcium once a day and Vit D.      She has has impaired fasting glucose. Hemoglobin A1c done in February 2017 was 5.3%. She does not exercise regularly. Her brother had diabetes mellitus. Last meal last night.        She has hyperlipidemia.  She stopped pravastatin 6 months ago because she thinks it may her sore.    She went off Lipitor 10 mg because of muscle weakness and pain.      The following portions of the patient's history were reviewed and updated as appropriate: allergies, current medications, past family history, past medical history, past social history, past surgical history and problem list.    Review of Systems   Constitutional: Negative.    HENT: Negative.    Eyes: Negative.    Respiratory: Negative.    Cardiovascular: Negative.    Gastrointestinal: Negative.    Endocrine: Negative.    Genitourinary: Positive for urgency.   Musculoskeletal: Negative.    Skin: Negative.    Allergic/Immunologic: Negative.    Neurological: Positive for numbness (legs ).   Hematological: Negative.    Psychiatric/Behavioral: Negative.        Objective      Vitals:    12/14/18 1032   BP: 124/60   BP Location: Right arm   Patient Position: Sitting   Cuff Size: Large Adult   Pulse: 69  "  SpO2: 96%   Weight: 84.4 kg (186 lb)   Height: 160 cm (62.99\")     Physical Exam  Office Visit on 12/18/2017   Component Date Value Ref Range Status   • Glucose 12/18/2017 94  65 - 99 mg/dL Final   • BUN 12/18/2017 14  8 - 23 mg/dL Final   • Creatinine 12/18/2017 0.68  0.57 - 1.00 mg/dL Final   • eGFR Non  Am 12/18/2017 85  >60 mL/min/1.73 Final    Comment: The MDRD GFR formula is only valid for adults with stable  renal function between ages 18 and 70.     • eGFR  Am 12/18/2017 103  >60 mL/min/1.73 Final   • BUN/Creatinine Ratio 12/18/2017 20.6  7.0 - 25.0 Final   • Sodium 12/18/2017 143  136 - 145 mmol/L Final   • Potassium 12/18/2017 5.0  3.5 - 5.2 mmol/L Final   • Chloride 12/18/2017 101  98 - 107 mmol/L Final   • Total CO2 12/18/2017 31.9* 22.0 - 29.0 mmol/L Final   • Calcium 12/18/2017 9.4  8.6 - 10.5 mg/dL Final   • Total Protein 12/18/2017 7.2  6.0 - 8.5 g/dL Final   • Albumin 12/18/2017 4.30  3.50 - 5.20 g/dL Final   • Globulin 12/18/2017 2.9  gm/dL Final   • A/G Ratio 12/18/2017 1.5  g/dL Final   • Total Bilirubin 12/18/2017 0.3  0.1 - 1.2 mg/dL Final   • Alkaline Phosphatase 12/18/2017 100  39 - 117 U/L Final   • AST (SGOT) 12/18/2017 12  1 - 32 U/L Final   • ALT (SGPT) 12/18/2017 10  1 - 33 U/L Final   • Total Cholesterol 12/18/2017 207* 0 - 200 mg/dL Final   • Triglycerides 12/18/2017 158* 0 - 150 mg/dL Final   • HDL Cholesterol 12/18/2017 45  40 - 60 mg/dL Final   • VLDL Cholesterol 12/18/2017 31.6  5 - 40 mg/dL Final   • LDL Cholesterol  12/18/2017 130* 0 - 100 mg/dL Final   • Hemoglobin A1C 12/18/2017 5.40  4.80 - 5.60 % Final    Comment: Hemoglobin A1C Ranges:  Increased Risk for Diabetes  5.7% to 6.4%  Diabetes                     >= 6.5%  Diabetic Goal                < 7.0%     • TSH 12/18/2017 3.200  0.270 - 4.200 mIU/mL Final   • Free T4 12/18/2017 1.68  0.93 - 1.70 ng/dL Final   • 25 Hydroxy, Vitamin D 12/18/2017 36.2  30.0 - 100.0 ng/mL Final    Comment: Reference Range for " Total Vitamin D 25(OH)  Deficiency    <20.0 ng/mL  Insufficiency 21-29 ng/mL  Sufficiency    ng/mL  Toxicity      >100 ng/ml        • Interpretation 12/18/2017 Note   Final    Supplemental report is available.     Assessment/Plan   Darby was seen today for hypothyroidism and hyperlipidemia.    Diagnoses and all orders for this visit:    Impaired fasting glucose  -     Comprehensive Metabolic Panel  -     Hemoglobin A1c    Postsurgical hypothyroidism  -     Comprehensive Metabolic Panel  -     Lipid Panel  -     TSH  -     T4, Free    Vitamin D deficiency  -     Comprehensive Metabolic Panel  -     Vitamin D 25 Hydroxy    Hyperlipidemia, unspecified hyperlipidemia type  -     Comprehensive Metabolic Panel  -     Lipid Panel    Osteopenia, unspecified location  -     Comprehensive Metabolic Panel  -     Vitamin D 25 Hydroxy      Continue Synthroid 100 µg per day.  Check thyroid function tests and hemoglobin A1c.  Check vitamin D.  Check lipid profile.    Send copy of my note to Dr. Shrestha    RTC 6 mos

## 2018-12-20 DIAGNOSIS — R68.89 ABNORMAL ENDOCRINE LABORATORY TEST FINDING: ICD-10-CM

## 2018-12-20 DIAGNOSIS — E78.49 OTHER HYPERLIPIDEMIA: Primary | ICD-10-CM

## 2019-02-11 DIAGNOSIS — E55.9 VITAMIN D DEFICIENCY: ICD-10-CM

## 2019-02-11 DIAGNOSIS — M85.80 OSTEOPENIA, UNSPECIFIED LOCATION: ICD-10-CM

## 2019-02-11 DIAGNOSIS — E89.0 POSTSURGICAL HYPOTHYROIDISM: ICD-10-CM

## 2019-02-11 DIAGNOSIS — E78.5 HYPERLIPIDEMIA, UNSPECIFIED HYPERLIPIDEMIA TYPE: ICD-10-CM

## 2019-02-11 RX ORDER — LEVOTHYROXINE SODIUM 100 MCG
TABLET ORAL
Qty: 90 TABLET | Refills: 0 | Status: SHIPPED | OUTPATIENT
Start: 2019-02-11 | End: 2019-06-27 | Stop reason: SDUPTHER

## 2019-02-12 LAB
ALBUMIN SERPL-MCNC: 4 G/DL (ref 3.5–4.8)
ALBUMIN/GLOB SERPL: 1.4 {RATIO} (ref 1.2–2.2)
ALP SERPL-CCNC: 105 IU/L (ref 39–117)
ALT SERPL-CCNC: 14 IU/L (ref 0–32)
AST SERPL-CCNC: 14 IU/L (ref 0–40)
BILIRUB SERPL-MCNC: 0.5 MG/DL (ref 0–1.2)
BUN SERPL-MCNC: 9 MG/DL (ref 8–27)
BUN/CREAT SERPL: 14 (ref 12–28)
CALCIUM SERPL-MCNC: 9.4 MG/DL (ref 8.7–10.3)
CHLORIDE SERPL-SCNC: 100 MMOL/L (ref 96–106)
CHOLEST SERPL-MCNC: 186 MG/DL (ref 100–199)
CO2 SERPL-SCNC: 25 MMOL/L (ref 20–29)
CREAT SERPL-MCNC: 0.66 MG/DL (ref 0.57–1)
GLOBULIN SER CALC-MCNC: 2.8 G/DL (ref 1.5–4.5)
GLUCOSE SERPL-MCNC: 93 MG/DL (ref 65–99)
HDLC SERPL-MCNC: 43 MG/DL
INTERPRETATION: NORMAL
LDLC SERPL CALC-MCNC: 123 MG/DL (ref 0–99)
POTASSIUM SERPL-SCNC: 4.7 MMOL/L (ref 3.5–5.2)
PROT SERPL-MCNC: 6.8 G/DL (ref 6–8.5)
SODIUM SERPL-SCNC: 141 MMOL/L (ref 134–144)
T4 FREE SERPL-MCNC: 1.65 NG/DL (ref 0.82–1.77)
TRIGL SERPL-MCNC: 99 MG/DL (ref 0–149)
TSH SERPL DL<=0.005 MIU/L-ACNC: 1.82 UIU/ML (ref 0.45–4.5)
VLDLC SERPL CALC-MCNC: 20 MG/DL (ref 5–40)

## 2019-02-14 ENCOUNTER — RESULTS ENCOUNTER (OUTPATIENT)
Dept: ENDOCRINOLOGY | Age: 74
End: 2019-02-14

## 2019-02-14 DIAGNOSIS — E78.49 OTHER HYPERLIPIDEMIA: ICD-10-CM

## 2019-02-14 DIAGNOSIS — R68.89 ABNORMAL ENDOCRINE LABORATORY TEST FINDING: ICD-10-CM

## 2019-02-19 DIAGNOSIS — E78.49 OTHER HYPERLIPIDEMIA: Primary | ICD-10-CM

## 2019-02-19 DIAGNOSIS — R68.89 ABNORMAL ENDOCRINE LABORATORY TEST FINDING: ICD-10-CM

## 2019-02-19 DIAGNOSIS — E78.5 HYPERLIPIDEMIA: ICD-10-CM

## 2019-02-19 RX ORDER — PRAVASTATIN SODIUM 20 MG
TABLET ORAL
Qty: 90 TABLET | Refills: 1 | Status: SHIPPED | OUTPATIENT
Start: 2019-02-19 | End: 2019-06-22 | Stop reason: SDUPTHER

## 2019-04-08 ENCOUNTER — RESULTS ENCOUNTER (OUTPATIENT)
Dept: ENDOCRINOLOGY | Age: 74
End: 2019-04-08

## 2019-04-08 DIAGNOSIS — R68.89 ABNORMAL ENDOCRINE LABORATORY TEST FINDING: ICD-10-CM

## 2019-04-08 DIAGNOSIS — E78.49 OTHER HYPERLIPIDEMIA: ICD-10-CM

## 2019-04-09 LAB
ALBUMIN SERPL-MCNC: 4.4 G/DL (ref 3.5–5.2)
ALBUMIN/GLOB SERPL: 1.6 G/DL
ALP SERPL-CCNC: 99 U/L (ref 39–117)
ALT SERPL-CCNC: 14 U/L (ref 1–33)
AST SERPL-CCNC: 17 U/L (ref 1–32)
BILIRUB SERPL-MCNC: 0.4 MG/DL (ref 0.2–1.2)
BUN SERPL-MCNC: 7 MG/DL (ref 8–23)
BUN/CREAT SERPL: 10.4 (ref 7–25)
CALCIUM SERPL-MCNC: 10 MG/DL (ref 8.6–10.5)
CHLORIDE SERPL-SCNC: 102 MMOL/L (ref 98–107)
CHOLEST SERPL-MCNC: 196 MG/DL (ref 0–200)
CO2 SERPL-SCNC: 28.4 MMOL/L (ref 22–29)
CREAT SERPL-MCNC: 0.67 MG/DL (ref 0.57–1)
GLOBULIN SER CALC-MCNC: 2.7 GM/DL
GLUCOSE SERPL-MCNC: 95 MG/DL (ref 65–99)
HDLC SERPL-MCNC: 43 MG/DL (ref 40–60)
INTERPRETATION: NORMAL
LDLC SERPL CALC-MCNC: 132 MG/DL (ref 0–100)
POTASSIUM SERPL-SCNC: 5 MMOL/L (ref 3.5–5.2)
PROT SERPL-MCNC: 7.1 G/DL (ref 6–8.5)
SODIUM SERPL-SCNC: 143 MMOL/L (ref 136–145)
TRIGL SERPL-MCNC: 103 MG/DL (ref 0–150)
VLDLC SERPL CALC-MCNC: 20.6 MG/DL

## 2019-04-26 DIAGNOSIS — M85.80 OSTEOPENIA, UNSPECIFIED LOCATION: ICD-10-CM

## 2019-04-26 DIAGNOSIS — E78.5 HYPERLIPIDEMIA, UNSPECIFIED HYPERLIPIDEMIA TYPE: ICD-10-CM

## 2019-04-26 DIAGNOSIS — E55.9 VITAMIN D DEFICIENCY: ICD-10-CM

## 2019-04-26 DIAGNOSIS — E89.0 POSTSURGICAL HYPOTHYROIDISM: ICD-10-CM

## 2019-04-26 RX ORDER — LEVOTHYROXINE SODIUM 100 MCG
TABLET ORAL
Qty: 90 TABLET | Refills: 1 | Status: SHIPPED | OUTPATIENT
Start: 2019-04-26 | End: 2019-10-10 | Stop reason: SDUPTHER

## 2019-06-22 DIAGNOSIS — R68.89 ABNORMAL ENDOCRINE LABORATORY TEST FINDING: ICD-10-CM

## 2019-06-22 DIAGNOSIS — E78.5 HYPERLIPIDEMIA: ICD-10-CM

## 2019-06-24 RX ORDER — PRAVASTATIN SODIUM 20 MG
TABLET ORAL
Qty: 90 TABLET | Refills: 0 | Status: SHIPPED | OUTPATIENT
Start: 2019-06-24 | End: 2019-07-22 | Stop reason: SDUPTHER

## 2019-06-27 NOTE — PROGRESS NOTES
Subjective   Darby Carey is a 73 y.o. female.     F/u for impaired fasting glucose, hypothyroidism , hyperlipidemia       Patient is 73 year-old female came in for followup. She has hypothyroidism after she had a thyroidectomy. She has been on Synthroid 100 mcg once a day.  She has  no weight change since December 2018.  She denies any heat or cold intolerance. She denies any bowel changes.      She has a history of osteopenia and follows with her gynecologist Danielle Chawla. She had a normal mammogram last 7/17. She had bone density in 2018 and there is no change. She is taking  chewable calcium once a day and Vit D.      She has has impaired fasting glucose. Hemoglobin A1c done in February 2017 was 5.3%. She does not exercise regularly. Her brother had diabetes mellitus. Last meal was last night.        She has hyperlipidemia.  She is on pravastatin 30 mg q PM.  She went off Lipitor 10 mg because of muscle weakness and pain.  Her last meal was last night    She has basal cell cancer on her nose which will be excised.    She had a normal colonoscopy except for diverticulosis in February 2019    The following portions of the patient's history were reviewed and updated as appropriate: allergies, current medications, past family history, past medical history, past social history, past surgical history and problem list.    Review of Systems   Constitutional: Negative.    HENT: Negative.    Eyes: Negative.    Respiratory: Negative.    Cardiovascular: Negative.    Gastrointestinal: Negative.    Endocrine: Negative.    Genitourinary: Negative.    Musculoskeletal: Positive for back pain.   Skin: Negative.    Allergic/Immunologic: Negative.    Neurological: Negative.    Hematological: Negative.    Psychiatric/Behavioral: Negative.        Objective      Vitals:    06/28/19 1210   BP: 122/70   BP Location: Right arm   Patient Position: Sitting   Cuff Size: Large Adult   Pulse: 63   SpO2: 92%   Weight: 84.6 kg (186 lb  "6.4 oz)   Height: 160 cm (62.99\")     Physical Exam   Constitutional: She is oriented to person, place, and time. She appears well-developed and well-nourished. No distress.   HENT:   Head: Normocephalic.   Right Ear: External ear normal.   Left Ear: External ear normal.   Mouth/Throat: Oropharynx is clear and moist. No oropharyngeal exudate.   Eyes: Conjunctivae and EOM are normal. Right eye exhibits no discharge. Left eye exhibits no discharge. No scleral icterus.   Neck: Neck supple. No JVD present. No tracheal deviation present. No thyromegaly present.   Cardiovascular: Normal rate, regular rhythm, normal heart sounds and intact distal pulses. Exam reveals no gallop and no friction rub.   No murmur heard.  Pulmonary/Chest: Effort normal and breath sounds normal. No stridor. No respiratory distress. She has no wheezes. She has no rales. She exhibits no tenderness.   Abdominal: Soft. Bowel sounds are normal. She exhibits no distension and no mass. There is no tenderness. There is no rebound and no guarding.   Musculoskeletal: Normal range of motion. She exhibits no edema, tenderness or deformity.   Lymphadenopathy:     She has no cervical adenopathy.   Neurological: She is alert and oriented to person, place, and time. She displays normal reflexes. Coordination normal.   Intact light touch on lower ext   Skin: Skin is warm and dry. No erythema. No pallor.   Psychiatric: She has a normal mood and affect. Her behavior is normal.     Results Encounter on 04/08/2019   Component Date Value Ref Range Status   • Glucose 04/08/2019 95  65 - 99 mg/dL Final   • BUN 04/08/2019 7* 8 - 23 mg/dL Final   • Creatinine 04/08/2019 0.67  0.57 - 1.00 mg/dL Final   • eGFR Non  Am 04/08/2019 87  >60 mL/min/1.73 Final    Comment: The MDRD GFR formula is only valid for adults with stable  renal function between ages 18 and 70.     • eGFR  Am 04/08/2019 105  >60 mL/min/1.73 Final   • BUN/Creatinine Ratio 04/08/2019 10.4  7.0 " - 25.0 Final   • Sodium 04/08/2019 143  136 - 145 mmol/L Final   • Potassium 04/08/2019 5.0  3.5 - 5.2 mmol/L Final   • Chloride 04/08/2019 102  98 - 107 mmol/L Final   • Total CO2 04/08/2019 28.4  22.0 - 29.0 mmol/L Final   • Calcium 04/08/2019 10.0  8.6 - 10.5 mg/dL Final   • Total Protein 04/08/2019 7.1  6.0 - 8.5 g/dL Final   • Albumin 04/08/2019 4.40  3.50 - 5.20 g/dL Final   • Globulin 04/08/2019 2.7  gm/dL Final   • A/G Ratio 04/08/2019 1.6  g/dL Final   • Total Bilirubin 04/08/2019 0.4  0.2 - 1.2 mg/dL Final   • Alkaline Phosphatase 04/08/2019 99  39 - 117 U/L Final   • AST (SGOT) 04/08/2019 17  1 - 32 U/L Final   • ALT (SGPT) 04/08/2019 14  1 - 33 U/L Final   • Total Cholesterol 04/08/2019 196  0 - 200 mg/dL Final   • Triglycerides 04/08/2019 103  0 - 150 mg/dL Final   • HDL Cholesterol 04/08/2019 43  40 - 60 mg/dL Final   • VLDL Cholesterol 04/08/2019 20.6  mg/dL Final   • LDL Cholesterol  04/08/2019 132* 0 - 100 mg/dL Final   • Interpretation 04/08/2019 Note   Final    Supplemental report is available.     Assessment/Plan   Darby was seen today for hypothyroidism and hyperlipidemia.    Diagnoses and all orders for this visit:    Impaired fasting glucose  -     Comprehensive Metabolic Panel  -     Hemoglobin A1c    Postsurgical hypothyroidism  -     Comprehensive Metabolic Panel  -     TSH    Vitamin D deficiency  -     Vitamin D 25 Hydroxy    Other hyperlipidemia  -     Comprehensive Metabolic Panel  -     Lipid Panel    Osteopenia of multiple sites      Continue no concentrated sweet low-fat diet.  Continue Synthroid 100 mcg/day.  Continue calcium and vitamin D supplements.  Follow-up with Dr. Danielle Chawla for osteopenia.  Continue pravastatin 30 mg/day.    Copy of my note sent to Dr. Jolly and Dr. Danielle Chawla.    RTC 4 mos

## 2019-06-28 ENCOUNTER — OFFICE VISIT (OUTPATIENT)
Dept: ENDOCRINOLOGY | Age: 74
End: 2019-06-28

## 2019-06-28 VITALS
HEART RATE: 63 BPM | HEIGHT: 63 IN | WEIGHT: 186.4 LBS | DIASTOLIC BLOOD PRESSURE: 70 MMHG | OXYGEN SATURATION: 92 % | SYSTOLIC BLOOD PRESSURE: 122 MMHG | BODY MASS INDEX: 33.03 KG/M2

## 2019-06-28 DIAGNOSIS — R73.01 IMPAIRED FASTING GLUCOSE: Primary | ICD-10-CM

## 2019-06-28 DIAGNOSIS — E78.49 OTHER HYPERLIPIDEMIA: ICD-10-CM

## 2019-06-28 DIAGNOSIS — M85.89 OSTEOPENIA OF MULTIPLE SITES: ICD-10-CM

## 2019-06-28 DIAGNOSIS — E55.9 VITAMIN D DEFICIENCY: ICD-10-CM

## 2019-06-28 DIAGNOSIS — E89.0 POSTSURGICAL HYPOTHYROIDISM: ICD-10-CM

## 2019-06-28 PROCEDURE — 99214 OFFICE O/P EST MOD 30 MIN: CPT | Performed by: INTERNAL MEDICINE

## 2019-06-28 RX ORDER — CHLORAL HYDRATE 500 MG
1000 CAPSULE ORAL
COMMUNITY

## 2019-06-29 LAB
25(OH)D3+25(OH)D2 SERPL-MCNC: 34.4 NG/ML (ref 30–100)
ALBUMIN SERPL-MCNC: 4 G/DL (ref 3.5–5.2)
ALBUMIN/GLOB SERPL: 1.5 G/DL
ALP SERPL-CCNC: 94 U/L (ref 39–117)
ALT SERPL-CCNC: 12 U/L (ref 1–33)
AST SERPL-CCNC: 15 U/L (ref 1–32)
BILIRUB SERPL-MCNC: 0.6 MG/DL (ref 0.2–1.2)
BUN SERPL-MCNC: 11 MG/DL (ref 8–23)
BUN/CREAT SERPL: 20.8 (ref 7–25)
CALCIUM SERPL-MCNC: 9.3 MG/DL (ref 8.6–10.5)
CHLORIDE SERPL-SCNC: 103 MMOL/L (ref 98–107)
CHOLEST SERPL-MCNC: 170 MG/DL (ref 0–200)
CO2 SERPL-SCNC: 28.7 MMOL/L (ref 22–29)
CREAT SERPL-MCNC: 0.53 MG/DL (ref 0.57–1)
GLOBULIN SER CALC-MCNC: 2.7 GM/DL
GLUCOSE SERPL-MCNC: 94 MG/DL (ref 65–99)
HBA1C MFR BLD: 5.4 % (ref 4.8–5.6)
HDLC SERPL-MCNC: 39 MG/DL (ref 40–60)
INTERPRETATION: NORMAL
LDLC SERPL CALC-MCNC: 105 MG/DL (ref 0–100)
Lab: NORMAL
POTASSIUM SERPL-SCNC: 4.9 MMOL/L (ref 3.5–5.2)
PROT SERPL-MCNC: 6.7 G/DL (ref 6–8.5)
SODIUM SERPL-SCNC: 141 MMOL/L (ref 136–145)
TRIGL SERPL-MCNC: 130 MG/DL (ref 0–150)
TSH SERPL DL<=0.005 MIU/L-ACNC: 1.15 MIU/ML (ref 0.27–4.2)
VLDLC SERPL CALC-MCNC: 26 MG/DL

## 2019-07-22 DIAGNOSIS — E78.5 HYPERLIPIDEMIA: ICD-10-CM

## 2019-07-22 DIAGNOSIS — R68.89 ABNORMAL ENDOCRINE LABORATORY TEST FINDING: ICD-10-CM

## 2019-07-22 RX ORDER — PRAVASTATIN SODIUM 20 MG
TABLET ORAL
Qty: 135 TABLET | Refills: 0 | Status: SHIPPED | OUTPATIENT
Start: 2019-07-22 | End: 2019-09-05 | Stop reason: SDUPTHER

## 2019-09-05 DIAGNOSIS — R68.89 ABNORMAL ENDOCRINE LABORATORY TEST FINDING: ICD-10-CM

## 2019-09-05 DIAGNOSIS — E78.5 HYPERLIPIDEMIA: ICD-10-CM

## 2019-09-05 RX ORDER — PRAVASTATIN SODIUM 20 MG
TABLET ORAL
Qty: 135 TABLET | Refills: 1 | Status: SHIPPED | OUTPATIENT
Start: 2019-09-05

## 2019-09-10 ENCOUNTER — APPOINTMENT (OUTPATIENT)
Dept: WOMENS IMAGING | Facility: HOSPITAL | Age: 74
End: 2019-09-10

## 2019-09-10 PROCEDURE — 77067 SCR MAMMO BI INCL CAD: CPT | Performed by: RADIOLOGY

## 2019-09-10 PROCEDURE — 77063 BREAST TOMOSYNTHESIS BI: CPT | Performed by: RADIOLOGY

## 2019-10-10 DIAGNOSIS — E89.0 POSTSURGICAL HYPOTHYROIDISM: ICD-10-CM

## 2019-10-10 DIAGNOSIS — M85.80 OSTEOPENIA, UNSPECIFIED LOCATION: ICD-10-CM

## 2019-10-10 DIAGNOSIS — E55.9 VITAMIN D DEFICIENCY: ICD-10-CM

## 2019-10-10 DIAGNOSIS — E78.5 HYPERLIPIDEMIA, UNSPECIFIED HYPERLIPIDEMIA TYPE: ICD-10-CM

## 2019-10-10 RX ORDER — LEVOTHYROXINE SODIUM 100 MCG
TABLET ORAL
Qty: 90 TABLET | Refills: 1 | Status: SHIPPED | OUTPATIENT
Start: 2019-10-10 | End: 2020-03-02

## 2019-11-06 NOTE — PROGRESS NOTES
Subjective   Darby Carey is a 73 y.o. female.     F/u for impaired fasting glucose, hypothyroidism, hyperlipidemia / fu vaccine given today        Patient is 73 year-old female came in for followup. She has hypothyroidism after she had a thyroidectomy. She has been on Synthroid 100 mcg once a day.  She has lost 2 pounds since June 2019..  She denies any heat or cold intolerance. She denies any bowel changes.      She has a history of osteopenia and follows with her gynecologist Danielle Chawla. She had a normal mammogram last 7/17. She had bone density in 2018 and there is no change. She is taking  chewable calcium once a day and Vit D.      She has has impaired fasting glucose. Hemoglobin A1c done in February 2017 was 5.3%. She is walking more. Her brother had diabetes mellitus. Last meal was last night.        She has hyperlipidemia.  She is on pravastatin 30 mg q PM.  She went off Lipitor 10 mg because of muscle weakness and pain.  Her last meal was last night     She has basal cell cancer on her nose which will be excised.     She had a normal colonoscopy except for diverticulosis in February 2019.    She had 2 pneumococcal vaccination in the past.  She had shingles vaccination in the remote past.    The following portions of the patient's history were reviewed and updated as appropriate: allergies, current medications, past family history, past medical history, past social history, past surgical history and problem list.    Review of Systems   Constitutional: Negative.    HENT: Negative.    Eyes: Negative.    Respiratory: Negative.    Cardiovascular: Negative.    Gastrointestinal: Negative.    Endocrine: Negative.    Genitourinary: Negative.    Musculoskeletal: Positive for arthralgias and back pain. Negative for myalgias.   Neurological: Negative for dizziness and weakness.   Hematological: Does not bruise/bleed easily.     Objective      Vitals:    11/07/19 1053   BP: 148/82   BP Location: Right arm  "  Patient Position: Sitting   Cuff Size: Large Adult   Pulse: 69   SpO2: 99%   Weight: 83.6 kg (184 lb 3.2 oz)   Height: 160 cm (62.99\")     Physical Exam   Constitutional: She is oriented to person, place, and time. She appears well-developed and well-nourished. No distress.   HENT:   Head: Normocephalic.   Right Ear: External ear normal.   Left Ear: External ear normal.   Nose: Nose normal.   Mouth/Throat: Oropharynx is clear and moist. No oropharyngeal exudate.   Eyes: Conjunctivae and EOM are normal. Right eye exhibits no discharge. Left eye exhibits no discharge. No scleral icterus.   Neck: Normal range of motion. Neck supple. No JVD present. No tracheal deviation present. No thyromegaly present.   Cardiovascular: Normal rate, regular rhythm, normal heart sounds and intact distal pulses. Exam reveals no gallop and no friction rub.   No murmur heard.  Pulmonary/Chest: Effort normal and breath sounds normal. No stridor. No respiratory distress. She has no wheezes. She has no rales. She exhibits no tenderness.   Abdominal: Soft. Bowel sounds are normal. She exhibits no distension and no mass. There is no tenderness. There is no rebound and no guarding.   Musculoskeletal: Normal range of motion. She exhibits no edema, tenderness or deformity.   Lymphadenopathy:     She has no cervical adenopathy.   Neurological: She is alert and oriented to person, place, and time. She displays normal reflexes. No sensory deficit. She exhibits normal muscle tone.   Skin: Skin is warm and dry. No rash noted. No erythema. No pallor.   Psychiatric: She has a normal mood and affect. Her behavior is normal.     Office Visit on 06/28/2019   Component Date Value Ref Range Status   • Glucose 06/28/2019 94  65 - 99 mg/dL Final   • BUN 06/28/2019 11  8 - 23 mg/dL Final   • Creatinine 06/28/2019 0.53* 0.57 - 1.00 mg/dL Final   • eGFR Non African Am 06/28/2019 113  >60 mL/min/1.73 Final    Comment: The MDRD GFR formula is only valid for adults " with stable  renal function between ages 18 and 70.     • eGFR  Am 06/28/2019 137  >60 mL/min/1.73 Final   • BUN/Creatinine Ratio 06/28/2019 20.8  7.0 - 25.0 Final   • Sodium 06/28/2019 141  136 - 145 mmol/L Final   • Potassium 06/28/2019 4.9  3.5 - 5.2 mmol/L Final   • Chloride 06/28/2019 103  98 - 107 mmol/L Final   • Total CO2 06/28/2019 28.7  22.0 - 29.0 mmol/L Final   • Calcium 06/28/2019 9.3  8.6 - 10.5 mg/dL Final   • Total Protein 06/28/2019 6.7  6.0 - 8.5 g/dL Final   • Albumin 06/28/2019 4.00  3.50 - 5.20 g/dL Final   • Globulin 06/28/2019 2.7  gm/dL Final   • A/G Ratio 06/28/2019 1.5  g/dL Final   • Total Bilirubin 06/28/2019 0.6  0.2 - 1.2 mg/dL Final   • Alkaline Phosphatase 06/28/2019 94  39 - 117 U/L Final   • AST (SGOT) 06/28/2019 15  1 - 32 U/L Final   • ALT (SGPT) 06/28/2019 12  1 - 33 U/L Final   • Total Cholesterol 06/28/2019 170  0 - 200 mg/dL Final   • Triglycerides 06/28/2019 130  0 - 150 mg/dL Final   • HDL Cholesterol 06/28/2019 39* 40 - 60 mg/dL Final   • VLDL Cholesterol 06/28/2019 26  mg/dL Final   • LDL Cholesterol  06/28/2019 105* 0 - 100 mg/dL Final   • Hemoglobin A1C 06/28/2019 5.40  4.80 - 5.60 % Final    Comment: Hemoglobin A1C Ranges:  Increased Risk for Diabetes  5.7% to 6.4%  Diabetes                     >= 6.5%  Diabetic Goal                < 7.0%     • TSH 06/28/2019 1.150  0.270 - 4.200 mIU/mL Final   • Interpretation 06/28/2019 Note   Final    Supplemental report is available.   • PDF Image 06/28/2019 Not applicable   Final   • 25 Hydroxy, Vitamin D 06/28/2019 34.4  30.0 - 100.0 ng/mL Final    Comment: Reference Range for Total Vitamin D 25(OH)  Deficiency <20.0 ng/mL  Insufficiency 21-29 ng/mL  Sufficiency  ng/mL  Toxicity >100 ng/ml       Assessment/Plan   Darby was seen today for hyperlipidemia and hypothyroidism.    Diagnoses and all orders for this visit:    Hyperlipidemia, unspecified hyperlipidemia type  -     Comprehensive Metabolic Panel  -     Lipid  Panel  -     CK  -     Aldolase    Vitamin D deficiency  -     Comprehensive Metabolic Panel  -     Vitamin D 25 Hydroxy    Postsurgical hypothyroidism  -     TSH    Impaired fasting glucose  -     Hemoglobin A1c    Osteopenia of multiple sites  -     Vitamin D 25 Hydroxy      Continue Synthroid 100 mcg/day.  Continue chewable calcium and vitamin D.  Repeat bone density in 2020 at her gynecologist  Continue pravastatin.  Continue no concentrated sweet low-fat diet.    Copy of my note sent to Dr. Shrestha and Dr. Chawla    RTC 4 mos.

## 2019-11-07 ENCOUNTER — OFFICE VISIT (OUTPATIENT)
Dept: ENDOCRINOLOGY | Age: 74
End: 2019-11-07

## 2019-11-07 VITALS
SYSTOLIC BLOOD PRESSURE: 148 MMHG | DIASTOLIC BLOOD PRESSURE: 82 MMHG | HEIGHT: 63 IN | WEIGHT: 184.2 LBS | OXYGEN SATURATION: 99 % | HEART RATE: 69 BPM | BODY MASS INDEX: 32.64 KG/M2

## 2019-11-07 DIAGNOSIS — E78.5 HYPERLIPIDEMIA, UNSPECIFIED HYPERLIPIDEMIA TYPE: Primary | ICD-10-CM

## 2019-11-07 DIAGNOSIS — E55.9 VITAMIN D DEFICIENCY: ICD-10-CM

## 2019-11-07 DIAGNOSIS — E89.0 POSTSURGICAL HYPOTHYROIDISM: ICD-10-CM

## 2019-11-07 DIAGNOSIS — R73.01 IMPAIRED FASTING GLUCOSE: ICD-10-CM

## 2019-11-07 DIAGNOSIS — M85.89 OSTEOPENIA OF MULTIPLE SITES: ICD-10-CM

## 2019-11-07 PROCEDURE — 90674 CCIIV4 VAC NO PRSV 0.5 ML IM: CPT | Performed by: INTERNAL MEDICINE

## 2019-11-07 PROCEDURE — 99214 OFFICE O/P EST MOD 30 MIN: CPT | Performed by: INTERNAL MEDICINE

## 2019-11-07 PROCEDURE — G0008 ADMIN INFLUENZA VIRUS VAC: HCPCS | Performed by: INTERNAL MEDICINE

## 2019-11-07 RX ORDER — ACETAMINOPHEN AND CODEINE PHOSPHATE 300; 30 MG/1; MG/1
TABLET ORAL
Refills: 0 | COMMUNITY
Start: 2019-09-18 | End: 2019-11-07 | Stop reason: ALTCHOICE

## 2019-11-08 LAB
25(OH)D3+25(OH)D2 SERPL-MCNC: 38.2 NG/ML (ref 30–100)
ALBUMIN SERPL-MCNC: 4.5 G/DL (ref 3.5–5.2)
ALBUMIN/GLOB SERPL: 1.8 G/DL
ALDOLASE SERPL-CCNC: 3.1 U/L (ref 3.3–10.3)
ALP SERPL-CCNC: 102 U/L (ref 39–117)
ALT SERPL-CCNC: 13 U/L (ref 1–33)
AST SERPL-CCNC: 19 U/L (ref 1–32)
BILIRUB SERPL-MCNC: 0.5 MG/DL (ref 0.2–1.2)
BUN SERPL-MCNC: 8 MG/DL (ref 8–23)
BUN/CREAT SERPL: 11.8 (ref 7–25)
CALCIUM SERPL-MCNC: 9.3 MG/DL (ref 8.6–10.5)
CHLORIDE SERPL-SCNC: 101 MMOL/L (ref 98–107)
CHOLEST SERPL-MCNC: 192 MG/DL (ref 0–200)
CK SERPL-CCNC: 55 U/L (ref 20–180)
CO2 SERPL-SCNC: 26.5 MMOL/L (ref 22–29)
CREAT SERPL-MCNC: 0.68 MG/DL (ref 0.57–1)
GLOBULIN SER CALC-MCNC: 2.5 GM/DL
GLUCOSE SERPL-MCNC: 97 MG/DL (ref 65–99)
HBA1C MFR BLD: 5.4 % (ref 4.8–5.6)
HDLC SERPL-MCNC: 43 MG/DL (ref 40–60)
INTERPRETATION: NORMAL
LDLC SERPL CALC-MCNC: 116 MG/DL (ref 0–100)
Lab: NORMAL
POTASSIUM SERPL-SCNC: 4.7 MMOL/L (ref 3.5–5.2)
PROT SERPL-MCNC: 7 G/DL (ref 6–8.5)
SODIUM SERPL-SCNC: 141 MMOL/L (ref 136–145)
TRIGL SERPL-MCNC: 163 MG/DL (ref 0–150)
TSH SERPL DL<=0.005 MIU/L-ACNC: 2.58 UIU/ML (ref 0.27–4.2)
VLDLC SERPL CALC-MCNC: 32.6 MG/DL

## 2020-03-01 DIAGNOSIS — M85.80 OSTEOPENIA, UNSPECIFIED LOCATION: ICD-10-CM

## 2020-03-01 DIAGNOSIS — E55.9 VITAMIN D DEFICIENCY: ICD-10-CM

## 2020-03-01 DIAGNOSIS — E78.5 HYPERLIPIDEMIA, UNSPECIFIED HYPERLIPIDEMIA TYPE: ICD-10-CM

## 2020-03-01 DIAGNOSIS — E89.0 POSTSURGICAL HYPOTHYROIDISM: ICD-10-CM

## 2020-03-02 RX ORDER — LEVOTHYROXINE SODIUM 100 MCG
TABLET ORAL
Qty: 90 TABLET | Refills: 1 | Status: SHIPPED | OUTPATIENT
Start: 2020-03-02 | End: 2020-08-03

## 2020-08-02 DIAGNOSIS — E55.9 VITAMIN D DEFICIENCY: ICD-10-CM

## 2020-08-02 DIAGNOSIS — E78.5 HYPERLIPIDEMIA, UNSPECIFIED HYPERLIPIDEMIA TYPE: ICD-10-CM

## 2020-08-02 DIAGNOSIS — E89.0 POSTSURGICAL HYPOTHYROIDISM: ICD-10-CM

## 2020-08-02 DIAGNOSIS — M85.80 OSTEOPENIA, UNSPECIFIED LOCATION: ICD-10-CM

## 2020-08-03 RX ORDER — LEVOTHYROXINE SODIUM 0.1 MG/1
100 TABLET ORAL DAILY
Qty: 90 TABLET | Refills: 0 | Status: SHIPPED | OUTPATIENT
Start: 2020-08-03 | End: 2020-11-04

## 2020-09-30 ENCOUNTER — APPOINTMENT (OUTPATIENT)
Dept: WOMENS IMAGING | Facility: HOSPITAL | Age: 75
End: 2020-09-30

## 2020-09-30 PROCEDURE — 77067 SCR MAMMO BI INCL CAD: CPT | Performed by: RADIOLOGY

## 2020-09-30 PROCEDURE — 77063 BREAST TOMOSYNTHESIS BI: CPT | Performed by: RADIOLOGY

## 2020-11-03 DIAGNOSIS — E89.0 POSTSURGICAL HYPOTHYROIDISM: ICD-10-CM

## 2020-11-03 DIAGNOSIS — E55.9 VITAMIN D DEFICIENCY: ICD-10-CM

## 2020-11-03 DIAGNOSIS — E78.5 HYPERLIPIDEMIA, UNSPECIFIED HYPERLIPIDEMIA TYPE: ICD-10-CM

## 2020-11-03 DIAGNOSIS — M85.80 OSTEOPENIA, UNSPECIFIED LOCATION: ICD-10-CM

## 2020-11-04 RX ORDER — LEVOTHYROXINE SODIUM 0.1 MG/1
TABLET ORAL
Qty: 90 TABLET | Refills: 0 | Status: SHIPPED | OUTPATIENT
Start: 2020-11-04

## 2021-01-17 DIAGNOSIS — E78.5 HYPERLIPIDEMIA, UNSPECIFIED HYPERLIPIDEMIA TYPE: ICD-10-CM

## 2021-01-17 DIAGNOSIS — E55.9 VITAMIN D DEFICIENCY: ICD-10-CM

## 2021-01-17 DIAGNOSIS — M85.80 OSTEOPENIA, UNSPECIFIED LOCATION: ICD-10-CM

## 2021-01-17 DIAGNOSIS — E89.0 POSTSURGICAL HYPOTHYROIDISM: ICD-10-CM

## 2021-01-18 RX ORDER — LEVOTHYROXINE SODIUM 0.1 MG/1
TABLET ORAL
Qty: 90 TABLET | Refills: 0 | OUTPATIENT
Start: 2021-01-18

## 2021-03-15 ENCOUNTER — BULK ORDERING (OUTPATIENT)
Dept: CASE MANAGEMENT | Facility: OTHER | Age: 76
End: 2021-03-15

## 2021-03-15 DIAGNOSIS — Z23 IMMUNIZATION DUE: ICD-10-CM

## 2021-10-20 ENCOUNTER — APPOINTMENT (OUTPATIENT)
Dept: WOMENS IMAGING | Facility: HOSPITAL | Age: 76
End: 2021-10-20

## 2021-10-20 PROCEDURE — 77067 SCR MAMMO BI INCL CAD: CPT | Performed by: RADIOLOGY

## 2021-10-20 PROCEDURE — 77063 BREAST TOMOSYNTHESIS BI: CPT | Performed by: RADIOLOGY

## 2022-11-02 ENCOUNTER — APPOINTMENT (OUTPATIENT)
Dept: WOMENS IMAGING | Facility: HOSPITAL | Age: 77
End: 2022-11-02

## 2022-11-02 PROCEDURE — 77063 BREAST TOMOSYNTHESIS BI: CPT | Performed by: RADIOLOGY

## 2022-11-02 PROCEDURE — 77067 SCR MAMMO BI INCL CAD: CPT | Performed by: RADIOLOGY
